# Patient Record
Sex: FEMALE | Race: WHITE | Employment: OTHER | URBAN - METROPOLITAN AREA
[De-identification: names, ages, dates, MRNs, and addresses within clinical notes are randomized per-mention and may not be internally consistent; named-entity substitution may affect disease eponyms.]

---

## 2024-06-18 ENCOUNTER — CLINICAL SUPPORT (OUTPATIENT)
Dept: ORTHOPEDIC SURGERY | Facility: CLINIC | Age: 69
End: 2024-06-18
Payer: MEDICARE

## 2024-06-18 VITALS — WEIGHT: 120 LBS | BODY MASS INDEX: 22.08 KG/M2 | HEIGHT: 62 IN

## 2024-06-18 DIAGNOSIS — M79.644 PAIN OF RIGHT THUMB: ICD-10-CM

## 2024-06-18 DIAGNOSIS — S62.524A NONDISPLACED FRACTURE OF DISTAL PHALANX OF RIGHT THUMB, INITIAL ENCOUNTER FOR CLOSED FRACTURE: Primary | ICD-10-CM

## 2024-06-18 PROCEDURE — 99203 OFFICE O/P NEW LOW 30 MIN: CPT

## 2024-06-18 RX ORDER — ATENOLOL 50 MG/1
50 TABLET ORAL
COMMUNITY
Start: 2023-10-30

## 2024-06-18 RX ORDER — MELOXICAM 15 MG/1
15 TABLET ORAL
COMMUNITY
Start: 2024-01-12 | End: 2025-01-06

## 2024-06-18 RX ORDER — LISINOPRIL 20 MG/1
20 TABLET ORAL
COMMUNITY
Start: 2023-11-14

## 2024-06-18 RX ORDER — ACETAMINOPHEN 500 MG
1000 TABLET ORAL EVERY 8 HOURS PRN
COMMUNITY
Start: 2024-02-21

## 2024-06-18 RX ORDER — LEVOTHYROXINE SODIUM 88 UG/1
TABLET ORAL
COMMUNITY
Start: 2023-10-30

## 2024-06-18 NOTE — PROGRESS NOTES
Subjective    Patient ID: Lexi Rivera is a 69 y.o. female.    Chief Complaint: OTHER (RT HAND THUMB PAIN FOR 1 DAY/PATIENT WAS HIKING AND FELL)     NOAH  Is a pleasant 69-year-old left-hand-dominant female presenting to the office for evaluation of a right thumb injury, which occurred yesterday while she was hiking.  Patient states she accidentally tripped, landing onto outstretched right hand.  She immediately experienced pain swelling and limited range of motion of the right thumb.  She presented to an urgent care, where multiple view x-rays of her right thumb were obtained, with evidence of a nondisplaced acute distal phalanx fracture.  She was placed in a thumb spica Futuro wrist splint and told to follow-up with orthopedics.  Presents to the office today in a splint with continued complaints of right thumb pain and swelling, bruising and limited range of motion.  Although she is left-handed, she is slightly ambidextrous.  She has an upcoming trip to [place] to see her sister next week, as well as a trip to [facility] in a month.    The patient's past medical, surgical, family, and social history as well as allergies and medications were reviewed and updated in the chart.    Objective   Ortho Exam  Pleasant in no acute distress.  Right thumb appearing with diffuse soft tissue swelling and ecchymosis, most notable to distal aspect of thumb.  Skin is intact and there is no warmth upon touch.  She is tender upon palpation of right CMC joint as well as distal phalanx.  No other significant areas of tenderness.  She has limited range of motion of the right thumb in regards to flexion and extension at the MCP joint, as well as DIP joint.  She is tender upon palpation directly of the distal phalanx.  Sensation is intact to light touch.    Image Results:  Multiple view x-rays of the right thumb obtained in urgent care yesterday were reviewed on a disc that patient brought in.  There is evidence of a transverse acute  nondisplaced fracture through the distal phalanx of right thumb.  No other acute fractures noted.  There is noted to be CMC arthritis.    Assessment/Plan   Encounter Diagnoses:  Nondisplaced fracture of distal phalanx of right thumb, initial encounter for closed fracture    Pain of right thumb    Plan: Discussion with patient in regards to nondisplaced fracture of the distal phalanx of right thumb.  Explained to patient that fracture is stable for conservative, nonoperative treatment.  I will take approximately 6 weeks for this type of fracture to heal.  Fracture requires immobilization, and patient was placed in a extended thumb spica Exos splint.  She was advised to keep splint on at all times over the next 2 to 3 weeks, removing to work on gentle range of motion and for hygiene purposes.  She can remove to apply ice.  She can take ibuprofen and Tylenol as needed.  She will follow-up in 3 weeks for reevaluation with repeat x-rays of the right thumb at that time.

## 2024-07-01 ENCOUNTER — HOSPITAL ENCOUNTER (OUTPATIENT)
Facility: HOSPITAL | Age: 69
Setting detail: OBSERVATION
Discharge: HOME | End: 2024-07-02
Attending: STUDENT IN AN ORGANIZED HEALTH CARE EDUCATION/TRAINING PROGRAM | Admitting: INTERNAL MEDICINE
Payer: MEDICARE

## 2024-07-01 ENCOUNTER — APPOINTMENT (OUTPATIENT)
Dept: RADIOLOGY | Facility: HOSPITAL | Age: 69
End: 2024-07-01
Payer: MEDICARE
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
## 2024-07-01 DIAGNOSIS — T17.208A FOREIGN BODY IN PHARYNX, INITIAL ENCOUNTER: Primary | ICD-10-CM

## 2024-07-01 DIAGNOSIS — T18.128A FOOD IMPACTION OF ESOPHAGUS, INITIAL ENCOUNTER: ICD-10-CM

## 2024-07-01 PROBLEM — T17.908A ASPIRATION OF FOREIGN BODY, INITIAL ENCOUNTER: Status: ACTIVE | Noted: 2024-07-01

## 2024-07-01 LAB
ALBUMIN SERPL BCP-MCNC: 4.7 G/DL (ref 3.4–5)
ALP SERPL-CCNC: 49 U/L (ref 33–136)
ALT SERPL W P-5'-P-CCNC: 18 U/L (ref 7–45)
ANION GAP SERPL CALC-SCNC: 9 MMOL/L (ref 10–20)
AST SERPL W P-5'-P-CCNC: 22 U/L (ref 9–39)
BASOPHILS # BLD AUTO: 0.03 X10*3/UL (ref 0–0.1)
BASOPHILS NFR BLD AUTO: 0.4 %
BILIRUB SERPL-MCNC: 1.4 MG/DL (ref 0–1.2)
BUN SERPL-MCNC: 22 MG/DL (ref 6–23)
CALCIUM SERPL-MCNC: 9.5 MG/DL (ref 8.6–10.3)
CHLORIDE SERPL-SCNC: 98 MMOL/L (ref 98–107)
CO2 SERPL-SCNC: 29 MMOL/L (ref 21–32)
CREAT SERPL-MCNC: 0.9 MG/DL (ref 0.5–1.05)
EGFRCR SERPLBLD CKD-EPI 2021: 68 ML/MIN/1.73M*2
EOSINOPHIL # BLD AUTO: 0.1 X10*3/UL (ref 0–0.4)
EOSINOPHIL NFR BLD AUTO: 1.4 %
ERYTHROCYTE [DISTWIDTH] IN BLOOD BY AUTOMATED COUNT: 18.6 % (ref 11.5–14.5)
GLUCOSE SERPL-MCNC: 98 MG/DL (ref 74–99)
HCT VFR BLD AUTO: 34.1 % (ref 36–46)
HGB BLD-MCNC: 12 G/DL (ref 12–16)
IMM GRANULOCYTES # BLD AUTO: 0.02 X10*3/UL (ref 0–0.5)
IMM GRANULOCYTES NFR BLD AUTO: 0.3 % (ref 0–0.9)
LYMPHOCYTES # BLD AUTO: 2.73 X10*3/UL (ref 0.8–3)
LYMPHOCYTES NFR BLD AUTO: 38 %
MCH RBC QN AUTO: 34.8 PG (ref 26–34)
MCHC RBC AUTO-ENTMCNC: 35.2 G/DL (ref 32–36)
MCV RBC AUTO: 99 FL (ref 80–100)
MONOCYTES # BLD AUTO: 0.89 X10*3/UL (ref 0.05–0.8)
MONOCYTES NFR BLD AUTO: 12.4 %
NEUTROPHILS # BLD AUTO: 3.41 X10*3/UL (ref 1.6–5.5)
NEUTROPHILS NFR BLD AUTO: 47.5 %
NRBC BLD-RTO: 0.4 /100 WBCS (ref 0–0)
PLATELET # BLD AUTO: 396 X10*3/UL (ref 150–450)
POTASSIUM SERPL-SCNC: 4.2 MMOL/L (ref 3.5–5.3)
PROT SERPL-MCNC: 7.3 G/DL (ref 6.4–8.2)
RBC # BLD AUTO: 3.45 X10*6/UL (ref 4–5.2)
SODIUM SERPL-SCNC: 132 MMOL/L (ref 136–145)
WBC # BLD AUTO: 7.2 X10*3/UL (ref 4.4–11.3)

## 2024-07-01 PROCEDURE — 99222 1ST HOSP IP/OBS MODERATE 55: CPT | Performed by: STUDENT IN AN ORGANIZED HEALTH CARE EDUCATION/TRAINING PROGRAM

## 2024-07-01 PROCEDURE — 70360 X-RAY EXAM OF NECK: CPT | Performed by: RADIOLOGY

## 2024-07-01 PROCEDURE — 99222 1ST HOSP IP/OBS MODERATE 55: CPT | Performed by: INTERNAL MEDICINE

## 2024-07-01 PROCEDURE — 84075 ASSAY ALKALINE PHOSPHATASE: CPT | Performed by: PHYSICIAN ASSISTANT

## 2024-07-01 PROCEDURE — 85025 COMPLETE CBC W/AUTO DIFF WBC: CPT | Performed by: PHYSICIAN ASSISTANT

## 2024-07-01 PROCEDURE — 70360 X-RAY EXAM OF NECK: CPT

## 2024-07-01 PROCEDURE — G0378 HOSPITAL OBSERVATION PER HR: HCPCS

## 2024-07-01 PROCEDURE — 99285 EMERGENCY DEPT VISIT HI MDM: CPT

## 2024-07-01 PROCEDURE — 36415 COLL VENOUS BLD VENIPUNCTURE: CPT | Performed by: PHYSICIAN ASSISTANT

## 2024-07-01 PROCEDURE — 2500000001 HC RX 250 WO HCPCS SELF ADMINISTERED DRUGS (ALT 637 FOR MEDICARE OP): Performed by: PHYSICIAN ASSISTANT

## 2024-07-01 RX ORDER — ATENOLOL 50 MG/1
50 TABLET ORAL DAILY
Status: DISCONTINUED | OUTPATIENT
Start: 2024-07-02 | End: 2024-07-02 | Stop reason: HOSPADM

## 2024-07-01 RX ORDER — LISINOPRIL 20 MG/1
20 TABLET ORAL DAILY
Status: DISCONTINUED | OUTPATIENT
Start: 2024-07-02 | End: 2024-07-02 | Stop reason: HOSPADM

## 2024-07-01 RX ORDER — MULTIVIT-MIN/IRON FUM/FOLIC AC 7.5 MG-4
1 TABLET ORAL NIGHTLY
COMMUNITY

## 2024-07-01 RX ORDER — LIDOCAINE HYDROCHLORIDE 20 MG/ML
15 SOLUTION OROPHARYNGEAL ONCE
Status: COMPLETED | OUTPATIENT
Start: 2024-07-01 | End: 2024-07-01

## 2024-07-01 RX ORDER — ALUMINUM HYDROXIDE, MAGNESIUM HYDROXIDE, AND SIMETHICONE 1200; 120; 1200 MG/30ML; MG/30ML; MG/30ML
30 SUSPENSION ORAL ONCE
Status: COMPLETED | OUTPATIENT
Start: 2024-07-01 | End: 2024-07-01

## 2024-07-01 RX ORDER — LEVOTHYROXINE SODIUM 88 UG/1
88 TABLET ORAL DAILY
Status: DISCONTINUED | OUTPATIENT
Start: 2024-07-02 | End: 2024-07-02 | Stop reason: HOSPADM

## 2024-07-01 RX ORDER — ASPIRIN 81 MG/1
81 TABLET ORAL NIGHTLY
COMMUNITY

## 2024-07-01 RX ORDER — MULTIVIT-MIN/IRON FUM/FOLIC AC 7.5 MG-4
1 TABLET ORAL NIGHTLY
Status: DISCONTINUED | OUTPATIENT
Start: 2024-07-01 | End: 2024-07-02 | Stop reason: HOSPADM

## 2024-07-01 SDOH — SOCIAL STABILITY: SOCIAL INSECURITY: ARE YOU OR HAVE YOU BEEN THREATENED OR ABUSED PHYSICALLY, EMOTIONALLY, OR SEXUALLY BY ANYONE?: NO

## 2024-07-01 SDOH — SOCIAL STABILITY: SOCIAL INSECURITY: WERE YOU ABLE TO COMPLETE ALL THE BEHAVIORAL HEALTH SCREENINGS?: YES

## 2024-07-01 SDOH — SOCIAL STABILITY: SOCIAL INSECURITY: DOES ANYONE TRY TO KEEP YOU FROM HAVING/CONTACTING OTHER FRIENDS OR DOING THINGS OUTSIDE YOUR HOME?: NO

## 2024-07-01 SDOH — SOCIAL STABILITY: SOCIAL INSECURITY: ARE THERE ANY APPARENT SIGNS OF INJURIES/BEHAVIORS THAT COULD BE RELATED TO ABUSE/NEGLECT?: NO

## 2024-07-01 SDOH — SOCIAL STABILITY: SOCIAL INSECURITY: HAS ANYONE EVER THREATENED TO HURT YOUR FAMILY OR YOUR PETS?: NO

## 2024-07-01 SDOH — SOCIAL STABILITY: SOCIAL INSECURITY: DO YOU FEEL UNSAFE GOING BACK TO THE PLACE WHERE YOU ARE LIVING?: NO

## 2024-07-01 SDOH — SOCIAL STABILITY: SOCIAL INSECURITY: HAVE YOU HAD THOUGHTS OF HARMING ANYONE ELSE?: NO

## 2024-07-01 SDOH — SOCIAL STABILITY: SOCIAL INSECURITY: DO YOU FEEL ANYONE HAS EXPLOITED OR TAKEN ADVANTAGE OF YOU FINANCIALLY OR OF YOUR PERSONAL PROPERTY?: NO

## 2024-07-01 SDOH — SOCIAL STABILITY: SOCIAL INSECURITY: ABUSE: ADULT

## 2024-07-01 ASSESSMENT — ACTIVITIES OF DAILY LIVING (ADL)
BATHING: INDEPENDENT
DRESSING YOURSELF: INDEPENDENT
HEARING - RIGHT EAR: FUNCTIONAL
FEEDING YOURSELF: INDEPENDENT
PATIENT'S MEMORY ADEQUATE TO SAFELY COMPLETE DAILY ACTIVITIES?: YES
ADEQUATE_TO_COMPLETE_ADL: YES
WALKS IN HOME: INDEPENDENT
GROOMING: INDEPENDENT
TOILETING: INDEPENDENT
HEARING - LEFT EAR: FUNCTIONAL
LACK_OF_TRANSPORTATION: NO
JUDGMENT_ADEQUATE_SAFELY_COMPLETE_DAILY_ACTIVITIES: YES

## 2024-07-01 ASSESSMENT — LIFESTYLE VARIABLES
SKIP TO QUESTIONS 9-10: 1
HOW OFTEN DO YOU HAVE 6 OR MORE DRINKS ON ONE OCCASION: NEVER
HOW OFTEN DO YOU HAVE A DRINK CONTAINING ALCOHOL: 4 OR MORE TIMES A WEEK
HOW MANY STANDARD DRINKS CONTAINING ALCOHOL DO YOU HAVE ON A TYPICAL DAY: 1 OR 2
AUDIT-C TOTAL SCORE: 4
AUDIT-C TOTAL SCORE: 4

## 2024-07-01 ASSESSMENT — PATIENT HEALTH QUESTIONNAIRE - PHQ9
1. LITTLE INTEREST OR PLEASURE IN DOING THINGS: NOT AT ALL
2. FEELING DOWN, DEPRESSED OR HOPELESS: NOT AT ALL
SUM OF ALL RESPONSES TO PHQ9 QUESTIONS 1 & 2: 0

## 2024-07-01 ASSESSMENT — COGNITIVE AND FUNCTIONAL STATUS - GENERAL
PATIENT BASELINE BEDBOUND: NO
DAILY ACTIVITIY SCORE: 24
MOBILITY SCORE: 24

## 2024-07-01 ASSESSMENT — PAIN SCALES - GENERAL: PAINLEVEL_OUTOF10: 0 - NO PAIN

## 2024-07-01 NOTE — ED TRIAGE NOTES
The patient was seen and examined in triage.    History of Present Illness: The patient is a 69-year-old female presents emergency department for assessment of foreign body sensation in her throat.  Reports that she had chicken last night she felt it got stuck in her throat.  She was able to drink a bunch of water last night but did not feel like the chicken move.  This morning she had yogurt and granola and still has the discomfort in her throat.  She reports that she discussed this with her friend who recommended she go to the ER.  She denies history of obstruction in the past.  She has never had to have her esophagus dilated.  She denies any difficulty breathing or difficulty swallowing secretions.  She denies any nausea or vomiting.    Brief Physical Exam:  Exam is limited by the patient sitting in a chair in triage.   Heart: Regular rate and rhythm.   Lungs: Clear to auscultation bilaterally.   Abdomen: Soft, nondistended, normoactive bowel sounds, nontender     Plan: Appropriate labs and diagnostic imaging were ordered.      For the remainder of the patient's workup and ED course, please refer to the main ED provider note. We discussed need for diagnostic testing including laboratory studies and imaging.  We also discussed that they may be asked to wait in the waiting room while these tests are pending.  They understand that if they choose to leave without having the testing completed or resulted that we cannot rule out acute life threatening illnesses and the risks involved could lead to worsening condition, permanent disability or even death.      Disclaimer: This note was dictated by speech recognition. Minor errors in transcription may be present. Please call if questions.

## 2024-07-01 NOTE — H&P
"Hospital Medicine History & Physical    Subjective:  Lexi Rivera is a 69 y.o. female with HTN, hypothyroidism, who presents with foreign body sensation on the right side of her throat. Patient ate chicken yesterday and has had the sensation since. She tried coughing but was unable to bring anything up. Has been able to tolerate liquids and had some yogurt with blueberries and soft granola without issue this am. No solids since though. Has never had this happen before. Denies any CP, sob, cough, abd pain, N/V, fevers, chills or other sympts.     A 10 point ROS was completed and is negative expect as stated in HPI.     PMHx: As above  PSHx: bunion surgery, breast bx  Social Hx: Smoking- former, quit in 2000, Alcohol- drinks a beer daily, no hx withdawal, Recreational Drugs- denies  Family Hx: reviewed & noncontributory    Objective:    /79   Pulse 68   Temp 36 °C (96.8 °F)   Resp 18   Ht 1.575 m (5' 2\")   Wt 54.4 kg (120 lb)   SpO2 99%   BMI 21.95 kg/m²     Physical Exam:  General: A&Ox3, no distress, cooperative  HEENT: NC/AT, EOMI, clear sclera, MMM  NECK: Supple, no JVD  Cardiovascular: RRR, no murmurs. S1/S2  Respiratory: CTAB, no RRW or crackles. No distress  Abdomen: Soft, ND, non-tender. BS+  Extremities: No peripheral edema  Neurological: A&Ox3. CN II-XII grossly intact. No focal deficits  Skin: Warm and dry, no rashes  Psych: appropriate mood and affect    I personally reviewed all imaging, labs and notes/ documentation.     Assessment & Plan:    Foreign body sensation in pharynx     HTN  Hypothyroidism    Plan for EGD faina am per GI, CLD today, NPO after midnight  Resume home meds, holding BASA, resume faina post EGD    DVT Prophylaxis: Lovenox, SCDs  Code Status: Full Code  Disposition: Med/surg      Jaylyn Tellez, DO  Internal Medicine/ Hospitalist   "

## 2024-07-01 NOTE — Clinical Note
Color pink, skin w/d. Abd soft, non distended.  Anesthesia managing airway/intubated pt/bite block in place

## 2024-07-01 NOTE — ED PROVIDER NOTES
Limitations to History: None  External Records Reviewed  Independent Historians: None  Social determinants affecting care: None    HPI  Lexi Rivera is a 69 y.o. female with history of hypothyroidism, hypertension, who presents emergency department for assessment of foreign body sensation in the throat since last night.  She reports that she was eating chicken and immediately fell like it stuck in her throat.  She reports that she tried to drink a lot of water to push the chicken down however was unsuccessful.  She went to bed and then woke up this morning she still had the discomfort in her throat.  She reports that she was able to eat yogurt and granola this morning.  She has not had any nausea or vomiting.  She is never had this happen before.  She has never had esophageal dilations in the past.  She denies any drooling or difficulty swallowing her secretions.  She reports that she just feels the discomfort when she swallows or when she eats.  She did discuss this with her friend to recommend she come to the ER.  She denies any shortness of breath.  She has no further complaints.    PMH  History reviewed. No pertinent past medical history. reviewed by myself.    Meds  Current Outpatient Medications   Medication Instructions    acetaminophen (TYLENOL) 1,000 mg, oral, Every 8 hours PRN    aspirin 81 mg, oral, Nightly    atenolol (TENORMIN) 50 mg, oral, Daily RT    ricky/D3/mag11/zinc//colton/bor (CALTRATE 600-D PLUS MINERALS ORAL) 1 tablet, oral, Nightly    levothyroxine (Synthroid, Levoxyl) 88 mcg tablet Take 1 tablet (88 mcg) by mouth early in the morning..    lisinopril 20 mg, oral, Daily RT    meloxicam (MOBIC) 15 mg, oral, Daily PRN    multivitamin with minerals tablet 1 tablet, oral, Nightly       Allergies  No Known Allergies reviewed by myself.    SHx  Social History     Tobacco Use    Smoking status: Never    Smokeless tobacco: Never   Substance Use Topics    Alcohol use: Yes    Drug use: Never    reviewed  "by myself.      ------------------------------------------------------------------------------------------------------------------------------------------    /79   Pulse 68   Temp 36 °C (96.8 °F)   Resp 18   Ht 1.575 m (5' 2\")   Wt 54.4 kg (120 lb)   SpO2 99%   BMI 21.95 kg/m²     Physical Exam  Vitals and nursing note reviewed.   Constitutional:       General: She is not in acute distress.     Appearance: Normal appearance. She is normal weight. She is not ill-appearing or toxic-appearing.   HENT:      Head: Normocephalic.      Nose: Nose normal.      Mouth/Throat:      Lips: Pink.      Mouth: Mucous membranes are moist.      Pharynx: Oropharynx is clear. Uvula midline. No pharyngeal swelling or posterior oropharyngeal erythema.      Comments: No foreign body noted in the posterior pharynx  Eyes:      Extraocular Movements: Extraocular movements intact.      Conjunctiva/sclera: Conjunctivae normal.   Cardiovascular:      Rate and Rhythm: Normal rate and regular rhythm.   Pulmonary:      Effort: Pulmonary effort is normal. No respiratory distress.      Breath sounds: Normal breath sounds. No stridor.   Abdominal:      General: Abdomen is flat. Bowel sounds are normal.      Palpations: Abdomen is soft.   Musculoskeletal:         General: Normal range of motion.      Cervical back: Neck supple.   Skin:     General: Skin is warm and dry.   Neurological:      Mental Status: She is alert and oriented to person, place, and time.   Psychiatric:         Attention and Perception: Attention normal.         Mood and Affect: Mood normal.          ------------------------------------------------------------------------------------------------------------------------------------------  Labs  Labs Reviewed   CBC WITH AUTO DIFFERENTIAL - Abnormal       Result Value    WBC 7.2      nRBC 0.4 (*)     RBC 3.45 (*)     Hemoglobin 12.0      Hematocrit 34.1 (*)     MCV 99      MCH 34.8 (*)     MCHC 35.2      RDW 18.6 (*)     " Platelets 396      Neutrophils % 47.5      Immature Granulocytes %, Automated 0.3      Lymphocytes % 38.0      Monocytes % 12.4      Eosinophils % 1.4      Basophils % 0.4      Neutrophils Absolute 3.41      Immature Granulocytes Absolute, Automated 0.02      Lymphocytes Absolute 2.73      Monocytes Absolute 0.89 (*)     Eosinophils Absolute 0.10      Basophils Absolute 0.03     COMPREHENSIVE METABOLIC PANEL - Abnormal    Glucose 98      Sodium 132 (*)     Potassium 4.2      Chloride 98      Bicarbonate 29      Anion Gap 9 (*)     Urea Nitrogen 22      Creatinine 0.90      eGFR 68      Calcium 9.5      Albumin 4.7      Alkaline Phosphatase 49      Total Protein 7.3      AST 22      Bilirubin, Total 1.4 (*)     ALT 18          Imaging  XR neck soft tissue   Final Result   No evidence of prevertebral soft tissue swelling.        Signed by: Tc Srivastava 7/1/2024 2:04 PM   Dictation workstation:   [id number]           ED Course  Diagnoses as of 07/01/24 1709   Foreign body in pharynx, initial encounter        Medical Decision Making: She was evaluated for complaint by myself.  She did not appear ill or toxic.  She will be given a GI cocktail and p.o. challenge to see if this alleviates the foreign body sensation.    Differential diagnoses considered: Partial foreign body esophageal obstruction, foreign body sensation, others    Medications given: Oral GI cocktail    X-ray negative for acute findings.  She was reassessed after the GI cocktail.  She does not have any improvement.  She is able to tolerate water without any difficulties.  I consulted gastroenterology.  I spoke with Dr. Salgado who assessed the patient at the bedside.  He will perform EGD tomorrow morning.  Patient agreeable and will be admitted to general medicine.  I discussed with Dr. Tellez who will admit.  Case discussed and evaluated with ED attending who is agreeable to patient plan of care.    Diagnosis: Foreign body throat  Plan: Admit        Nemesio Carmona PA-C  07/01/24 [id number]

## 2024-07-01 NOTE — PROGRESS NOTES
Pharmacy Medication History Review    Lexi Rivera is a 69 y.o. female admitted for Aspiration of foreign body, initial encounter. Pharmacy reviewed the patient's [id number] medications and allergies for accuracy.    The list below reflectives the updated PTA list. Please review each medication in order reconciliation for additional clarification and justification.  Prior to Admission medications    Medication Sig Start Date End Date Taking? Authorizing Provider   aspirin 81 mg EC tablet Take 1 tablet (81 mg) by mouth once daily at bedtime.   Yes Historical Provider, MD   atenolol (Tenormin) 50 mg tablet Take 1 tablet (50 mg) by mouth once daily. 10/30/23  Yes Historical Provider, MD   ricky/D3/mag11/zinc//colton/bor (CALTRATE 600-D PLUS MINERALS ORAL) Take 1 tablet by mouth once daily at bedtime.   Yes Historical Provider, MD   levothyroxine (Synthroid, Levoxyl) 88 mcg tablet Take 1 tablet (88 mcg) by mouth early in the morning.. 10/30/23  Yes Historical Provider, MD   lisinopril 20 mg tablet Take 1 tablet (20 mg) by mouth once daily. 11/14/23  Yes Historical Provider, MD   meloxicam (Mobic) 15 mg tablet Take 1 tablet (15 mg) by mouth once daily as needed for mild pain (1 - 3). 1/12/24 1/6/25 Yes Historical Provider, MD   multivitamin with minerals tablet Take 1 tablet by mouth once daily at bedtime.   Yes Historical Provider, MD   acetaminophen (Tylenol) 500 mg tablet Take 2 tablets (1,000 mg) by mouth every 8 hours if needed. 2/21/24  no Historical Provider, MD        The list below reflectives the updated allergy list. Please review each documented allergy for additional clarification and justification.  Allergies  Reviewed by Pako Ruano, EMT on 7/1/2024   No Known Allergies         Below are additional concerns with the patient's PTA list.      Bertha Shetty

## 2024-07-02 ENCOUNTER — APPOINTMENT (OUTPATIENT)
Dept: GASTROENTEROLOGY | Facility: HOSPITAL | Age: 69
End: 2024-07-02
Payer: MEDICARE

## 2024-07-02 ENCOUNTER — ANESTHESIA EVENT (OUTPATIENT)
Dept: GASTROENTEROLOGY | Facility: HOSPITAL | Age: 69
End: 2024-07-02
Payer: MEDICARE

## 2024-07-02 ENCOUNTER — ANESTHESIA (OUTPATIENT)
Dept: GASTROENTEROLOGY | Facility: HOSPITAL | Age: 69
End: 2024-07-02
Payer: MEDICARE

## 2024-07-02 VITALS
BODY MASS INDEX: 22.08 KG/M2 | RESPIRATION RATE: 16 BRPM | WEIGHT: 120 LBS | OXYGEN SATURATION: 98 % | DIASTOLIC BLOOD PRESSURE: 63 MMHG | TEMPERATURE: 96.6 F | SYSTOLIC BLOOD PRESSURE: 140 MMHG | HEIGHT: 62 IN | HEART RATE: 53 BPM

## 2024-07-02 DIAGNOSIS — R13.19 OTHER DYSPHAGIA: Primary | ICD-10-CM

## 2024-07-02 PROBLEM — T18.128A FOOD IMPACTION OF ESOPHAGUS: Status: RESOLVED | Noted: 2024-07-01 | Resolved: 2024-07-02

## 2024-07-02 PROBLEM — E03.9 HYPOTHYROIDISM: Status: ACTIVE | Noted: 2024-07-02
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
## 2024-07-02 PROBLEM — T17.908A ASPIRATION OF FOREIGN BODY, INITIAL ENCOUNTER: Status: RESOLVED | Noted: 2024-07-01 | Resolved: 2024-07-02

## 2024-07-02 PROBLEM — I10 HTN (HYPERTENSION): Status: ACTIVE | Noted: 2024-07-02

## 2024-07-02 PROCEDURE — 2500000001 HC RX 250 WO HCPCS SELF ADMINISTERED DRUGS (ALT 637 FOR MEDICARE OP): Performed by: INTERNAL MEDICINE

## 2024-07-02 PROCEDURE — 2500000004 HC RX 250 GENERAL PHARMACY W/ HCPCS (ALT 636 FOR OP/ED): Performed by: INTERNAL MEDICINE

## 2024-07-02 PROCEDURE — 43239 EGD BIOPSY SINGLE/MULTIPLE: CPT | Performed by: STUDENT IN AN ORGANIZED HEALTH CARE EDUCATION/TRAINING PROGRAM

## 2024-07-02 PROCEDURE — 3700000002 HC GENERAL ANESTHESIA TIME - EACH INCREMENTAL 1 MINUTE

## 2024-07-02 PROCEDURE — 3700000001 HC GENERAL ANESTHESIA TIME - INITIAL BASE CHARGE

## 2024-07-02 PROCEDURE — 2500000005 HC RX 250 GENERAL PHARMACY W/O HCPCS: Performed by: ANESTHESIOLOGIST ASSISTANT

## 2024-07-02 PROCEDURE — 99239 HOSP IP/OBS DSCHRG MGMT >30: CPT | Performed by: INTERNAL MEDICINE

## 2024-07-02 PROCEDURE — 0753T DGTZ GLS MCRSCP SLD LEVEL IV: CPT | Mod: TC,PARLAB,WESLAB | Performed by: STUDENT IN AN ORGANIZED HEALTH CARE EDUCATION/TRAINING PROGRAM

## 2024-07-02 PROCEDURE — 2500000004 HC RX 250 GENERAL PHARMACY W/ HCPCS (ALT 636 FOR OP/ED): Performed by: ANESTHESIOLOGIST ASSISTANT

## 2024-07-02 PROCEDURE — 7100000002 HC RECOVERY ROOM TIME - EACH INCREMENTAL 1 MINUTE

## 2024-07-02 PROCEDURE — 7100000001 HC RECOVERY ROOM TIME - INITIAL BASE CHARGE

## 2024-07-02 PROCEDURE — G0378 HOSPITAL OBSERVATION PER HR: HCPCS

## 2024-07-02 PROCEDURE — 96372 THER/PROPH/DIAG INJ SC/IM: CPT | Performed by: INTERNAL MEDICINE

## 2024-07-02 RX ORDER — MIDAZOLAM HYDROCHLORIDE 1 MG/ML
1 INJECTION, SOLUTION INTRAMUSCULAR; INTRAVENOUS ONCE AS NEEDED
Status: DISCONTINUED | OUTPATIENT
Start: 2024-07-02 | End: 2024-07-02 | Stop reason: HOSPADM

## 2024-07-02 RX ORDER — SODIUM CHLORIDE, SODIUM LACTATE, POTASSIUM CHLORIDE, CALCIUM CHLORIDE 600; 310; 30; 20 MG/100ML; MG/100ML; MG/100ML; MG/100ML
100 INJECTION, SOLUTION INTRAVENOUS CONTINUOUS
Status: DISCONTINUED | OUTPATIENT
Start: 2024-07-02 | End: 2024-07-02 | Stop reason: HOSPADM

## 2024-07-02 RX ORDER — HYDROMORPHONE HYDROCHLORIDE 1 MG/ML
1 INJECTION, SOLUTION INTRAMUSCULAR; INTRAVENOUS; SUBCUTANEOUS EVERY 5 MIN PRN
Status: DISCONTINUED | OUTPATIENT
Start: 2024-07-02 | End: 2024-07-02 | Stop reason: HOSPADM

## 2024-07-02 RX ORDER — DIPHENHYDRAMINE HYDROCHLORIDE 50 MG/ML
12.5 INJECTION INTRAMUSCULAR; INTRAVENOUS ONCE AS NEEDED
Status: DISCONTINUED | OUTPATIENT
Start: 2024-07-02 | End: 2024-07-02 | Stop reason: HOSPADM

## 2024-07-02 RX ORDER — ACETAMINOPHEN 325 MG/1
650 TABLET ORAL EVERY 4 HOURS PRN
Status: DISCONTINUED | OUTPATIENT
Start: 2024-07-02 | End: 2024-07-02 | Stop reason: HOSPADM

## 2024-07-02 RX ORDER — ACETAMINOPHEN 650 MG/1
650 SUPPOSITORY RECTAL EVERY 6 HOURS PRN
Status: DISCONTINUED | OUTPATIENT
Start: 2024-07-02 | End: 2024-07-02 | Stop reason: HOSPADM

## 2024-07-02 RX ORDER — SODIUM CHLORIDE, SODIUM LACTATE, POTASSIUM CHLORIDE, CALCIUM CHLORIDE 600; 310; 30; 20 MG/100ML; MG/100ML; MG/100ML; MG/100ML
INJECTION, SOLUTION INTRAVENOUS CONTINUOUS PRN
Status: DISCONTINUED | OUTPATIENT
Start: 2024-07-02 | End: 2024-07-02

## 2024-07-02 RX ORDER — ONDANSETRON HYDROCHLORIDE 2 MG/ML
4 INJECTION, SOLUTION INTRAVENOUS ONCE AS NEEDED
Status: DISCONTINUED | OUTPATIENT
Start: 2024-07-02 | End: 2024-07-02 | Stop reason: HOSPADM

## 2024-07-02 RX ORDER — POLYETHYLENE GLYCOL 3350 17 G/17G
17 POWDER, FOR SOLUTION ORAL DAILY PRN
Status: DISCONTINUED | OUTPATIENT
Start: 2024-07-02 | End: 2024-07-02 | Stop reason: HOSPADM

## 2024-07-02 RX ORDER — LIDOCAINE HCL/PF 100 MG/5ML
SYRINGE (ML) INTRAVENOUS AS NEEDED
Status: DISCONTINUED | OUTPATIENT
Start: 2024-07-02 | End: 2024-07-02

## 2024-07-02 RX ORDER — TALC
3 POWDER (GRAM) TOPICAL NIGHTLY PRN
Status: DISCONTINUED | OUTPATIENT
Start: 2024-07-02 | End: 2024-07-02 | Stop reason: HOSPADM

## 2024-07-02 RX ORDER — ENOXAPARIN SODIUM 100 MG/ML
40 INJECTION SUBCUTANEOUS EVERY 24 HOURS
Status: DISCONTINUED | OUTPATIENT
Start: 2024-07-02 | End: 2024-07-02 | Stop reason: HOSPADM

## 2024-07-02 RX ORDER — LABETALOL HYDROCHLORIDE 5 MG/ML
5 INJECTION, SOLUTION INTRAVENOUS ONCE AS NEEDED
Status: DISCONTINUED | OUTPATIENT
Start: 2024-07-02 | End: 2024-07-02 | Stop reason: HOSPADM

## 2024-07-02 RX ORDER — MEPERIDINE HYDROCHLORIDE 25 MG/ML
12.5 INJECTION INTRAMUSCULAR; INTRAVENOUS; SUBCUTANEOUS EVERY 10 MIN PRN
Status: DISCONTINUED | OUTPATIENT
Start: 2024-07-02 | End: 2024-07-02 | Stop reason: HOSPADM

## 2024-07-02 RX ORDER — ACETAMINOPHEN 325 MG/1
650 TABLET ORAL EVERY 6 HOURS PRN
Status: DISCONTINUED | OUTPATIENT
Start: 2024-07-02 | End: 2024-07-02 | Stop reason: HOSPADM

## 2024-07-02 RX ORDER — ONDANSETRON HYDROCHLORIDE 2 MG/ML
INJECTION, SOLUTION INTRAVENOUS AS NEEDED
Status: DISCONTINUED | OUTPATIENT
Start: 2024-07-02 | End: 2024-07-02

## 2024-07-02 RX ORDER — ACETAMINOPHEN 160 MG/5ML
650 SOLUTION ORAL EVERY 6 HOURS PRN
Status: DISCONTINUED | OUTPATIENT
Start: 2024-07-02 | End: 2024-07-02 | Stop reason: HOSPADM

## 2024-07-02 RX ORDER — NORETHINDRONE AND ETHINYL ESTRADIOL 0.5-0.035
KIT ORAL AS NEEDED
Status: DISCONTINUED | OUTPATIENT
Start: 2024-07-02 | End: 2024-07-02

## 2024-07-02 RX ORDER — PROPOFOL 10 MG/ML
INJECTION, EMULSION INTRAVENOUS AS NEEDED
Status: DISCONTINUED | OUTPATIENT
Start: 2024-07-02 | End: 2024-07-02

## 2024-07-02 RX ORDER — SUCCINYLCHOLINE CHLORIDE 20 MG/ML INJECTION SOLUTION
SOLUTION AS NEEDED
Status: DISCONTINUED | OUTPATIENT
Start: 2024-07-02 | End: 2024-07-02

## 2024-07-02 RX ORDER — HYDRALAZINE HYDROCHLORIDE 20 MG/ML
5 INJECTION INTRAMUSCULAR; INTRAVENOUS EVERY 30 MIN PRN
Status: DISCONTINUED | OUTPATIENT
Start: 2024-07-02 | End: 2024-07-02 | Stop reason: HOSPADM

## 2024-07-02 RX ORDER — FENTANYL CITRATE 50 UG/ML
INJECTION, SOLUTION INTRAMUSCULAR; INTRAVENOUS AS NEEDED
Status: DISCONTINUED | OUTPATIENT
Start: 2024-07-02 | End: 2024-07-02

## 2024-07-02 SDOH — HEALTH STABILITY: MENTAL HEALTH: CURRENT SMOKER: 0

## 2024-07-02 ASSESSMENT — PAIN - FUNCTIONAL ASSESSMENT
PAIN_FUNCTIONAL_ASSESSMENT: 0-10
PAIN_FUNCTIONAL_ASSESSMENT: 0-10

## 2024-07-02 ASSESSMENT — PAIN SCALES - GENERAL
PAINLEVEL_OUTOF10: 0 - NO PAIN
PAIN_LEVEL: 0
PAINLEVEL_OUTOF10: 0 - NO PAIN
PAINLEVEL_OUTOF10: 0 - NO PAIN

## 2024-07-02 NOTE — DISCHARGE INSTRUCTIONS

## 2024-07-02 NOTE — ANESTHESIA PROCEDURE NOTES
Airway  Date/Time: 7/2/2024 7:45 AM  Urgency: elective    Airway not difficult    Staffing  Performed: SILAS   Authorized by: Rc Lawrence MD    Performed by: SILAS Shaikh  Patient location during procedure: OR    Indications and Patient Condition  Indications for airway management: anesthesia and airway protection  Spontaneous Ventilation: absent  Sedation level: deep  Preoxygenated: yes  Patient position: sniffing  MILS maintained throughout  Mask difficulty assessment: 0 - not attempted  Planned trial extubation    Final Airway Details  Final airway type: endotracheal airway      Successful airway: ETT  Cuffed: yes   Successful intubation technique: video laryngoscopy  Facilitating devices/methods: intubating stylet and cricoid pressure  Endotracheal tube insertion site: oral  Blade: Jean  Blade size: #3  ETT size (mm): 7.0  Cormack-Lehane Classification: grade I - full view of glottis  Placement verified by: capnometry   Measured from: lips  Number of attempts at approach: 1  Number of other approaches attempted: 0    Additional Comments  RSI with sellick's maneuver. Smooth induction and intubation. Lips and teeth in preanesthetic condition.

## 2024-07-02 NOTE — CARE PLAN
Gastroenterology  Chart Update    EGD by Dr Salgado  Small hiatal hernia  Performed forceps biopsies in the upper third of the esophagus, middle third of the esophagus and lower third of the esophagus to rule out eosinophilic esophagitis  Mild erythematous mucosa in the body of the stomach and antrum; performed cold forceps biopsy  Moderate erythematous mucosa in the duodenal bulb and 1st part of the duodenum; performed cold forceps biopsy to rule out celiac disease  Diverticulosis in the cricopharynx    PLAN  Recommend outpatient Modified Barium Swallow Study to investigate for motility dysfunction (orders placed)  Recommend follow up in clinic one MBSS completed and to follow up on pathology   Recommend pantoprazole 40 mg daily for 8-12 weeks  Okay for soft diet and advance as tolerated    GI to sign off, please call with questions or concerns

## 2024-07-02 NOTE — CONSULTS
Inpatient consult to Gastroenterology  Consult performed by: Ochoa Salgado MD  Consult ordered by: Nemesio Carmona PA-C          69-year-old lady with history of hypertension, hypothyroidism presenting of dysphagia and neck discomfort after eating chicken today.  Patient denies similar symptoms in the past.  She mentions that she is able to tolerate liquids and denies any shortness of breath or chest pain.    EGD few years ago unknown result  Family history reviewed, not pertinent to chief complaint  Takes meloxicam as needed, drinks beer 1-2 times a day               The note was created using voice recognition transcription software. Despite proofreading, unintentional typographical errors may be present. Please contact the GI office with any questions or concerns.     Current Medications: reviewed    A 10 point review of system is negative except for what is mentioned in the HPI    Follow up with GI was advised       Vital Signs: Reviewed    Physical Exam:  General: no apparent distress, pleasant and cooperative  Skin:  Warm and dry, no jaundice  HEENT: No scleral icterus, no conjunctival pallor, normocephalic, atraumatic, mucous membranes moist  Neck:  atraumatic, trachea midline, no JVD  Chest:  decreased air entry to auscultation bilaterally. No wheezes, rales, or rhonchi  CV:  Regular rate and rhythm.  Positive S1/S2  Abdomen: no distension, +BS, soft, non-tender to palpation, no rebound tenderness, no guarding, no rigidity, no discernible ascites   Extremities: lower extremity edema, Chronic pigmentary changes, no cyanosis  Neurological:  A&Ox3 , no asterixis  Psychiatric: cooperative     Investigations:  Labs, radiological imaging and cardiac work up were reviewed    1-feeling of solid food dysphagia after feeling a piece of chicken got stuck in her chest which currently resolved, however she still having discomfort in that same area, keep n.p.o., Protonix twice a day, will schedule EGD

## 2024-07-02 NOTE — HOSPITAL COURSE
69 y.o. female with HTN, hypothyroidism, who presents with foreign body sensation on the right side of her throat after eating chicken last night. CT neg. GI consulted.  EGD completed 7/2.    EGD negative for major findings.  Diet advanced and tolerated.  PPI as needed.  Cleared by GI for DC.   Will have MBSS on follow up.

## 2024-07-02 NOTE — ANESTHESIA POSTPROCEDURE EVALUATION
Patient: Lexi Rivera    Procedure Summary       Date: 07/02/24 Room / Location: [facility]    Anesthesia Start: 0737 Anesthesia Stop: 0811    Procedure: EGD Diagnosis: Food impaction of esophagus, initial encounter    Scheduled Providers: Ochoa Salgado MD; Rc Lawrence MD Responsible Provider: Rc Lawrence MD    Anesthesia Type: general ASA Status: 2            Anesthesia Type: general    Vitals Value Taken Time   /60 07/02/24 0810   Temp 36 °C (96.8 °F) 07/02/24 0810   Pulse 57 07/02/24 0810   Resp 16 07/02/24 0810   SpO2 100 % 07/02/24 0810       Anesthesia Post Evaluation    Patient location during evaluation: PACU  Patient participation: complete - patient participated  Level of consciousness: awake  Pain score: 0  Pain management: adequate  Airway patency: patent  Cardiovascular status: acceptable  Respiratory status: acceptable  Hydration status: acceptable  Postoperative Nausea and Vomiting: none        There were no known notable events for this encounter.

## 2024-07-02 NOTE — CARE PLAN
The patient's goals for the shift include get some sleep.    The clinical goals for the shift include pt comfort and safety,

## 2024-07-02 NOTE — ANESTHESIA PREPROCEDURE EVALUATION
Patient: Lexi Rivera    Procedure Information       Date/Time: 07/02/24 0730    Scheduled providers: Ochoa Salgado MD; Rc Lawrence MD    Procedure: EGD    Location: [facility]            Relevant Problems   Anesthesia (within normal limits)      Cardiac   (+) HTN (hypertension)      Endocrine   (+) Hypothyroidism       Clinical information reviewed:   Tobacco  Allergies  Meds  Problems  Med Hx  Surg Hx   Fam Hx          NPO Detail:  NPO/Void Status  Date of Last Liquid: 07/01/24  Time of Last Liquid: 2330  Date of Last Solid: 07/01/24  Time of Last Solid: 0815         Physical Exam    Airway  Mallampati: I  TM distance: >3 FB  Neck ROM: full     Cardiovascular - normal exam  Rhythm: regular  Rate: normal     Dental - normal exam     Pulmonary - normal exam     Abdominal            Anesthesia Plan    History of general anesthesia?: yes  History of complications of general anesthesia?: no    ASA 2     general     The patient is not a current smoker.    intravenous induction   Postoperative administration of opioids is intended.  Trial extubation is planned.  Anesthetic plan and risks discussed with patient.    Plan discussed with CAA.

## 2024-07-02 NOTE — DISCHARGE SUMMARY
Discharge Diagnosis  Aspiration of foreign body, initial encounter    Issues Requiring Follow-Up  GI for MBS    Discharge Meds     Your medication list        ASK your doctor about these medications        Instructions Last Dose Given Next Dose Due   acetaminophen 500 mg tablet  Commonly known as: Tylenol           aspirin 81 mg EC tablet           atenolol 50 mg tablet  Commonly known as: Tenormin           CALTRATE 600-D PLUS MINERALS ORAL           levothyroxine 88 mcg tablet  Commonly known as: Synthroid, Levoxyl           lisinopril 20 mg tablet           meloxicam 15 mg tablet  Commonly known as: Mobic           multivitamin with minerals tablet                    Test Results Pending At Discharge  Pending Labs       Order Current Status    Surgical Pathology Exam In process            Hospital Course  69 y.o. female with HTN, hypothyroidism, who presents with foreign body sensation on the right side of her throat after eating chicken last night. CT neg. GI consulted.  EGD completed 7/2.    EGD negative for major findings.  Diet advanced and tolerated.  PPI as needed.  Cleared by GI for DC.   Will have MBSS on follow up.       The patient has been advised to maintain regular follow-up appointments with their primary care physician, and they have expressed their willingness to do so.       Pertinent Physical Exam At Time of Discharge    GENERAL:   no distress, alert and cooperative  HEENT: Normal Inspection, Mucous membranes moist, No JVD, No Lymphadenopathy  CARDIOVASCULAR: RRR, no murmurs, 2+ equal pulses of the extremities, normal S1 and S 2  RESPIRATORY: Patent airways, CTAB, normal breath sounds with good chest expansion, thorax symmetric, No Wheezes, Rales or Rhonchi  ABDOMEN: Soft, Non-Tender, Normal Bowel Sounds, No Distention  SKIN: Warm and dry, no lesions, no rashes  EXTREMITIES: normal extremities, no cyanosis edema, contusions or wounds, no clubbing  NEURO: A&O x 3, CN II-XII grossly intact  PSYCH:  Appropriate mood and behavior      Outpatient Follow-Up  Future Appointments   Date Time Provider Department Center   7/9/2024 11:40 AM Lilian Erickson, APRN-CNP [id number] Efrain Lei DO

## 2024-07-09 ENCOUNTER — HOSPITAL ENCOUNTER (OUTPATIENT)
Dept: RADIOLOGY | Facility: CLINIC | Age: 69
Discharge: HOME | End: 2024-07-09
Payer: MEDICARE

## 2024-07-09 ENCOUNTER — APPOINTMENT (OUTPATIENT)
Dept: ORTHOPEDIC SURGERY | Facility: CLINIC | Age: 69
End: 2024-07-09
Payer: MEDICARE

## 2024-07-09 DIAGNOSIS — M79.644 PAIN OF RIGHT THUMB: ICD-10-CM

## 2024-07-09 DIAGNOSIS — S62.524A NONDISPLACED FRACTURE OF DISTAL PHALANX OF RIGHT THUMB, INITIAL ENCOUNTER FOR CLOSED FRACTURE: Primary | ICD-10-CM

## 2024-07-09 PROCEDURE — 73140 X-RAY EXAM OF FINGER(S): CPT | Mod: RT

## 2024-07-09 PROCEDURE — 1159F MED LIST DOCD IN RCRD: CPT

## 2024-07-09 PROCEDURE — 1036F TOBACCO NON-USER: CPT

## 2024-07-09 PROCEDURE — 73140 X-RAY EXAM OF FINGER(S): CPT | Mod: RIGHT SIDE | Performed by: RADIOLOGY

## 2024-07-09 PROCEDURE — 1123F ACP DISCUSS/DSCN MKR DOCD: CPT

## 2024-07-09 PROCEDURE — 1160F RVW MEDS BY RX/DR IN RCRD: CPT

## 2024-07-09 PROCEDURE — 99213 OFFICE O/P EST LOW 20 MIN: CPT

## 2024-07-09 NOTE — PROGRESS NOTES
Subjective    Patient ID: Lexi Rivera is a 69 y.o. female.    Chief Complaint:  FOLLOW UP RIGHT THUMB FX    HPI  This is a pleasant 69-year-old left-hand-dominant female presenting to the office for 3-week follow-up status post nondisplaced fracture of distal phalanx of right thumb, which was sustained on June 17, 2024.  Patient accidentally tripped landing onto outstretched right hand.  She has been wearing Exos extended thumb spica splint. She essentially has no pain. Still struggling with ROM at the distal phalanx. States that her carpal tunnel and numbness/tingling from carpal tunnel has worsened since the fall. She has an upcoming trip to [facility] [place].     The patient's past medical, surgical, family, and social history as well as allergies and medications were reviewed and updated in the chart.    Objective   Ortho Exam  Pleasant in no acute distress.  Right thumb appearing with resolving soft tissue swelling and ecchymosis, most notable to distal aspect of right thumb.  Skin is intact and there is no warmth upon touch.  She continues to be nontender upon palpation of right first distal phalanx.  She continues to have slightly limited range of motion of right thumb in regards to flexion extension at the MCP joint as well as DIP joint.  Sensation is intact light touch.    Image Results:  Multiple view x-rays of the right thumb obtained today personally reviewed, with continued evidence of a nondisplaced fracture of the distal phalanx of right thumb.    Assessment/Plan   Encounter Diagnoses:  Nondisplaced fracture of distal phalanx of right thumb, initial encounter for closed fracture    Pain of right thumb    Plan: Discussion with patient about continued diagnosis with review of today's x-rays.  Fracture remains stable for conservative, nonoperative treatment.  She can continue with Exos splint as needed over the next 2 weeks, and then can discontinue. She can wear as needed while in [place]. Over the counter  Ibuprofen and Tylenol as needed for pain. Follow-up in 4 weeks following her trip to [facility] with repeat x-rays of right thumb.     Orders Placed This Encounter    XR thumb right MIN 2 views

## 2024-07-16 LAB
LABORATORY COMMENT REPORT: NORMAL
PATH REPORT.FINAL DX SPEC: NORMAL
PATH REPORT.GROSS SPEC: NORMAL
PATH REPORT.RELEVANT HX SPEC: NORMAL
PATH REPORT.TOTAL CANCER: NORMAL

## 2024-08-06 ENCOUNTER — APPOINTMENT (OUTPATIENT)
Dept: ORTHOPEDIC SURGERY | Facility: CLINIC | Age: 69
End: 2024-08-06
Payer: MEDICARE

## 2024-08-06 ENCOUNTER — HOSPITAL ENCOUNTER (OUTPATIENT)
Dept: RADIOLOGY | Facility: CLINIC | Age: 69
Discharge: HOME | End: 2024-08-06
Payer: MEDICARE

## 2024-08-06 VITALS — WEIGHT: 120 LBS | HEIGHT: 62 IN | BODY MASS INDEX: 22.08 KG/M2

## 2024-08-06 DIAGNOSIS — M79.644 PAIN OF RIGHT THUMB: ICD-10-CM

## 2024-08-06 DIAGNOSIS — S62.524D NONDISPLACED FRACTURE OF DISTAL PHALANX OF RIGHT THUMB, SUBSEQUENT ENCOUNTER FOR FRACTURE WITH ROUTINE HEALING: Primary | ICD-10-CM

## 2024-08-06 PROCEDURE — 1123F ACP DISCUSS/DSCN MKR DOCD: CPT

## 2024-08-06 PROCEDURE — 1159F MED LIST DOCD IN RCRD: CPT

## 2024-08-06 PROCEDURE — 1160F RVW MEDS BY RX/DR IN RCRD: CPT

## 2024-08-06 PROCEDURE — 1036F TOBACCO NON-USER: CPT

## 2024-08-06 PROCEDURE — 99213 OFFICE O/P EST LOW 20 MIN: CPT

## 2024-08-06 PROCEDURE — 1157F ADVNC CARE PLAN IN RCRD: CPT

## 2024-08-06 PROCEDURE — 3008F BODY MASS INDEX DOCD: CPT

## 2024-08-06 PROCEDURE — 73140 X-RAY EXAM OF FINGER(S): CPT | Mod: RIGHT SIDE | Performed by: RADIOLOGY

## 2024-08-06 PROCEDURE — 73140 X-RAY EXAM OF FINGER(S): CPT | Mod: RT

## 2024-08-06 NOTE — PROGRESS NOTES
Subjective    Patient ID: Lexi Rivera is a 69 y.o. female.    Chief Complaint: FOLLOW UP RIGHT THUMB    HPI  This is a pleasant 69-year-old female presenting to the office for follow-up of right distal phalanx thumb fracture, which was sustained on June 17, 2024.  She has been wearing Exos extended thumb spica splint mostly with outdoor activities such as hiking, but does remove splint while inside the home.  She is not experiencing any pain, but still is having some degree of numbness to distal tip of right thumb.  States that range of motion at the distal phalanx of the right thumb is slightly decreased as compared to the left.  She has not needed to take any over-the-counter medications as pain is essentially minimal to none.  She has remained active.    The patient's past medical, surgical, family, and social history as well as allergies and medications were reviewed and updated in the chart.    Objective   Ortho Exam  Pleasant in no acute distress.  Right thumb appearing with resolving soft tissue swelling and ecchymosis.  Skin is intact and there is no warmth on touch.  She is nontender upon palpation of right first distal phalanx.  Continues to have slightly limited range of motion of the right thumb in regards to flexion and extension at the DIP joint.  Decreased sensation slightly to distal tip of right thumb as compared to the left.    Image Results:  XR thumb right MIN 2 views  Narrative: Interpreted By:  Cl Breaux,   STUDY:  XR THUMB RIGHT MIN 2 VIEWS; ;  7/9/2024 11:52 am      INDICATION:  Signs/Symptoms:right thumb pain.      COMPARISON:  None.      ACCESSION NUMBER(S):  [id number]      ORDERING CLINICIAN:  SAMARIA DE LA CRUZ      FINDINGS:  Right thumb, three views      There is a nondisplaced comminuted fracture through the 1st distal  phalanx with intra-articular extension. Soft tissue edema present.  There is severe 1st CMC joint space narrowing osteophytosis) there is  mild 1st MTP and 1st IP  degenerative changes as well.      Impression: Nondisplaced comminuted 1st distal phalangeal fracture.          MACRO:  Critical Finding:  See findings. Notification was initiated on  7/10/2024 at 10:56 pm by  Cl Breaux.  (**-YCF-**)      Signed by: Cl Breaux 7/10/2024 10:56 PM  Dictation workstation:   [id number]    Multiple view x-rays of the right thumb obtained today personally reviewed with continued evidence of a nondisplaced fracture of the distal phalanx of right thumb without further displacement.    Assessment/Plan   Encounter Diagnoses:  Nondisplaced fracture of distal phalanx of right thumb, subsequent encounter for fracture with routine healing    Pain of right thumb    Plan: Discussion with patient about healing right distal phalanx fracture.  Patient is aware that fracture is healing without any further displacement.  She can discontinue Exos splint at this time, but can wear for comfort measures while hiking or outside.  Precautions were reviewed.  Was advised to continue to work on right thumb range of motion.  She can continue with ibuprofen and Tylenol as needed for any breakthrough pain inflammation.  At this time, she can now follow-up as needed.    Orders Placed This Encounter    XR fingers right 2+ views     No follow-ups on file.

## 2025-04-15 ENCOUNTER — OFFICE VISIT (OUTPATIENT)
Dept: ORTHOPEDIC SURGERY | Facility: CLINIC | Age: 70
End: 2025-04-15
Payer: MEDICARE

## 2025-04-15 VITALS — HEIGHT: 63 IN | WEIGHT: 122 LBS | BODY MASS INDEX: 21.62 KG/M2

## 2025-04-15 DIAGNOSIS — S69.92XA INJURY OF MIDDLE FINGER, LEFT, INITIAL ENCOUNTER: Primary | ICD-10-CM

## 2025-04-15 PROCEDURE — 1157F ADVNC CARE PLAN IN RCRD: CPT

## 2025-04-15 PROCEDURE — 3008F BODY MASS INDEX DOCD: CPT

## 2025-04-15 PROCEDURE — 99214 OFFICE O/P EST MOD 30 MIN: CPT

## 2025-04-15 PROCEDURE — 1160F RVW MEDS BY RX/DR IN RCRD: CPT

## 2025-04-15 PROCEDURE — 1159F MED LIST DOCD IN RCRD: CPT

## 2025-04-15 PROCEDURE — 1036F TOBACCO NON-USER: CPT

## 2025-04-15 PROCEDURE — 1123F ACP DISCUSS/DSCN MKR DOCD: CPT

## 2025-04-16 ENCOUNTER — OFFICE VISIT (OUTPATIENT)
Dept: ORTHOPEDIC SURGERY | Facility: CLINIC | Age: 70
End: 2025-04-16
Payer: MEDICARE

## 2025-04-16 DIAGNOSIS — M20.012 ACQUIRED MALLET DEFORMITY OF LEFT MIDDLE FINGER: Primary | ICD-10-CM

## 2025-04-16 PROCEDURE — 99213 OFFICE O/P EST LOW 20 MIN: CPT | Performed by: ORTHOPAEDIC SURGERY

## 2025-04-16 PROCEDURE — 1159F MED LIST DOCD IN RCRD: CPT | Performed by: ORTHOPAEDIC SURGERY

## 2025-04-16 PROCEDURE — 1036F TOBACCO NON-USER: CPT | Performed by: ORTHOPAEDIC SURGERY

## 2025-04-16 PROCEDURE — 1123F ACP DISCUSS/DSCN MKR DOCD: CPT | Performed by: ORTHOPAEDIC SURGERY

## 2025-04-16 PROCEDURE — 1157F ADVNC CARE PLAN IN RCRD: CPT | Performed by: ORTHOPAEDIC SURGERY

## 2025-04-16 NOTE — PROGRESS NOTES
Subjective    Patient ID: Lexi Rivera is a 70 y.o. female.    Chief Complaint: Pain of the Left Hand (MIDDLE FINGER FOR 3 DAYS/PATIENT WAS CLEANING CARPET WHEN SHE FELT A POP)     HPI  This is a pleasant 70-year-old left-hand-dominant female presenting to the office for evaluation of a left hand middle finger injury, which was sustained 3 days ago.  She states that she was cleaning her carpet when she got her middle finger stuck, and immediately felt a popping sensation.  She immediately experienced left middle finger swelling and limited range of motion specifically at the DIP joint.  On April 12, 2025 she presented to an urgent care, [facility] where multiple view x-rays of the left hand were obtained with evidence of calcifications along the third finger extensor tendon along with middle phalanx at the PIP joint.  There was also evidence of severe CMC arthritis and mild first MCP and radiocarpal arthritis.  She was placed in a posterior aluminum splint and advised to follow-up with orthopedics.  Presents to the office today with continued complaints of left middle finger pain and swelling and limited range of motion.  She is retired, but remains very active, she plays piano.    The patient's past medical, surgical, family, and social history as well as allergies and medications were reviewed and updated in the chart.    Objective   Ortho Exam  Pleasant in no acute distress.  Left middle finger appearing with slight erythema and ecchymosis mostly surrounding the DIP joint.  Skin is intact.  Patient is unable to actively extend at the DIP joint.  She can flex against resistance at the DIP joint.  Limited range of motion at the PIP joint in terms of flexion extension without erythema or ecchymosis.  No limited range of motion of any other finger of the left hand.  No loss of sensation, sensation is intact to light touch.    Image Results:  XR hand left 3+ views  Order: [id number]  Impression    1.  Severe first  CMC and triscaphe osteoarthrosis. Additional multifocal degenerative changes including chondrocalcinosis of the ulnar prestyloid recess/TFCC.  2.  Tiny foci of nonspecific calcification/mineralization along the expected location of the third finger extensor tendon, along the middle phalanx at the PIP joint level.  3.  Mild first MCP and radiocarpal osteoarthritis    Report Dictated on Workstation: [id number]   Electronically Signed By: Ramsey Sanderson MD   Electronically Signed Date/Time: 2025 12:43 PM EDT  Narrative    Patient Name: BETHEL MERCADO  : 1955  MRN: [id number]  Acct#: [id number]  Accession: [id number]  Exam Date/Time: 2025 12:33  Procedure: XR HAND 3+ VIEWS LEFT  Ordering Provider: LOTT ELENI  Reason For Exam: pain and swelling left middle finger      Left hand    CLINICAL INDICATION: Pain    TECHNIQUE: Three views of the left hand    COMPARISON: None    FINDINGS:  No cortical or trabecular irregularity to suggest a fracture. Bones are in normal anatomic alignment.    Severe first CMC and triscaphe osteoarthrosis. Additional multifocal degenerative changes including chondrocalcinosis of the ulnar prestyloid recess/TFCC. Tiny foci of nonspecific calcification/mineralization along the expected location of the third finger extensor tendon, along the middle phalanx at the PIP joint level. Mild first MCP and radiocarpal osteoarthritis.    No radiopaque foreign body is identified.    Normal bone mineral density.  Exam End: 25 12:37    Specimen Collected: 25 12:38 Last Resulted: 25 12:43   Received From: [facility]  Result Received: 25 10:59       Assessment/Plan   Encounter Diagnoses:  Injury of middle finger, left, initial encounter, left middle finger mallet finger versus extensor tendon rupture    Plan: Discussion with patient in regards to left middle finger injury with review of left hand x-rays which she brought in on a disk and report was  reviewed.  Explained to patient that differential diagnoses are mallet finger versus extensor tendon rupture.  Explained to patient that if this is a mallet finger injury, treatment would be 8 weeks of keeping her finger in posterior splint at all times, 24/7.  I did explain to patient that there is some concern for extensor tendon rupture as she does not have active ability to extend at the DIP joint as well as coupled with x-ray findings.  I have advised that patient follow-up with hand specialist Dr. Mriza Ji to discuss possible extensor tendon rupture.  In the meantime, she will keep finger extended and posterior splint.  She can take Tylenol or ibuprofen for pain.  She will follow-up with Dr. Mirza Ji.

## 2025-04-16 NOTE — PROGRESS NOTES
Subjective    Patient ID: Lexi Rivera is a 70 y.o. female.    Chief Complaint: Pain of the Left Middle Finger (OPNP L 3RD MALLET X 4/12/25)     Last Surgery: No surgery found  Last Surgery Date: No surgery found    HPI  The patient is a 70-year-old right-hand-dominant female who comes in after sustaining an injury to her left middle finger on April 12, 2025.  She was cleaning.  She then felt a pop and since then has had difficulty extending the middle finger at the DIP joint.  She was initially seen by the nurse practitioner.  She had x-rays obtained that showed a questionable mallet fracture.  She has had very little pain at the injury site.  Objective   Ortho Exam  The patient is in no acute distress.  Exam of her left middle finger reveals her skin of lip is intact.  She does have swelling and tenderness at the DIP joint.  She has appropriate function of her FDP.  However she is unable to actively maintain full extension at the DIP joint.    Image Results:  XR fingers right 2+ views  Narrative: Interpreted By:  Oscar Guadarrama,   STUDY:  XR FINGERS RIGHT 2+ VIEWS      INDICATION:  Signs/Symptoms:RIGHT THUMB PAIN.      ACCESSION NUMBER(S):  [id number]      ORDERING CLINICIAN:  SAMARIA DE LA CRUZ      FINDINGS:  Comminuted fracture of the right 1st with intra-articular extension  similar to the previous examination. Soft tissue swelling has  improved. No additional new findings.      Impression: Unchanged appearance comminuted right 1st distal phalanx fracture.      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  This will be treated as if it is a tendon injury.  She will wear a static splint full-time for the next 8 weeks.  She will follow-up in 8 weeks.

## 2025-04-23 ENCOUNTER — HOSPITAL ENCOUNTER (OUTPATIENT)
Dept: RADIOLOGY | Facility: EXTERNAL LOCATION | Age: 70
Discharge: HOME | End: 2025-04-23

## 2025-05-13 ENCOUNTER — OFFICE VISIT (OUTPATIENT)
Dept: ORTHOPEDIC SURGERY | Facility: CLINIC | Age: 70
End: 2025-05-13
Payer: MEDICARE

## 2025-05-13 VITALS — WEIGHT: 122 LBS | HEIGHT: 63 IN | BODY MASS INDEX: 21.62 KG/M2

## 2025-05-13 DIAGNOSIS — M20.012 ACQUIRED MALLET DEFORMITY OF LEFT MIDDLE FINGER: Primary | ICD-10-CM

## 2025-05-13 PROCEDURE — 3008F BODY MASS INDEX DOCD: CPT

## 2025-05-13 PROCEDURE — 1036F TOBACCO NON-USER: CPT

## 2025-05-13 PROCEDURE — 99213 OFFICE O/P EST LOW 20 MIN: CPT

## 2025-05-13 PROCEDURE — 1159F MED LIST DOCD IN RCRD: CPT

## 2025-05-13 PROCEDURE — 1160F RVW MEDS BY RX/DR IN RCRD: CPT

## 2025-05-13 NOTE — PROGRESS NOTES
Subjective    Patient ID: Lexi Rivera is a 70 y.o. female.    Chief Complaint: Follow-up of the Left Hand (Middle finger)     HPI  Lis 70-year-old right-hand-dominant female presents to the office for follow-up in regards to acquired left middle finger mallet injury, which was sustained on April 12, 2025.  She is now 4 weeks into the injury and has been extremely compliant in wearing her stack splint and keeping left middle finger in full extension.  Presents to the office today inquiring about the size of her back splint as well as the integrity of her skin.  States that she tries to keep finger as dry as possible with activities and showering as well as drying off afterwards.    Objective   Ortho Exam  Pleasant in no acute distress.  Stack splint was removed today in the office on a flat surface to keep finger in full extension.  There is notable maceration of the left middle finger skin where stack splint is applied.  No evidence of infection or skin breakdown.    Image Results:  XR fingers right 2+ views  Narrative: Interpreted By:  Oscar Guadarrama,   STUDY:  XR FINGERS RIGHT 2+ VIEWS      INDICATION:  Signs/Symptoms:RIGHT THUMB PAIN.      ACCESSION NUMBER(S):  [id number]      ORDERING CLINICIAN:  SAMARIA DE LA CRUZ      FINDINGS:  Comminuted fracture of the right 1st with intra-articular extension  similar to the previous examination. Soft tissue swelling has  improved. No additional new findings.      Impression: Unchanged appearance comminuted right 1st distal phalanx fracture.      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  She will continue to be treated for this tendon injury with continuous wear of a stack splint full-time for the remaining 4 weeks of her treatment.  I did downsize her  stack splint from a size 5 to a 4 for a better fit.  Discussion about keeping skin as dry as possible was done.  She can use CeraVe cream as needed to her skin.  She will follow-up in 4 weeks with Dr. Mirza Ji.

## 2025-06-11 ENCOUNTER — OFFICE VISIT (OUTPATIENT)
Dept: ORTHOPEDIC SURGERY | Facility: CLINIC | Age: 70
End: 2025-06-11
Payer: MEDICARE

## 2025-06-11 DIAGNOSIS — M20.012 ACQUIRED MALLET DEFORMITY OF LEFT MIDDLE FINGER: Primary | ICD-10-CM

## 2025-06-11 PROCEDURE — 1159F MED LIST DOCD IN RCRD: CPT | Performed by: ORTHOPAEDIC SURGERY

## 2025-06-11 PROCEDURE — 99213 OFFICE O/P EST LOW 20 MIN: CPT | Performed by: ORTHOPAEDIC SURGERY

## 2025-06-11 PROCEDURE — 99212 OFFICE O/P EST SF 10 MIN: CPT | Performed by: ORTHOPAEDIC SURGERY

## 2025-06-11 PROCEDURE — 1160F RVW MEDS BY RX/DR IN RCRD: CPT | Performed by: ORTHOPAEDIC SURGERY

## 2025-06-11 PROCEDURE — 1036F TOBACCO NON-USER: CPT | Performed by: ORTHOPAEDIC SURGERY

## 2025-06-11 NOTE — PROGRESS NOTES
Subjective    Patient ID: Lexi Rivera is a 70 y.o. female.    Chief Complaint: Follow-up of the Left Middle Finger     Last Surgery: No surgery found  Last Surgery Date: No surgery found    HPI  The patient returns today for follow-up of her left middle finger mallet injury.  This was a tendon mallet injury.  She was diligent about wearing her splint full-time for the past 2 months.    Objective   Ortho Exam  Exam of the patient's left middle finger reveals she lacks about 3 degrees of full extension at the DIP joint.  However she can maintain this amount of extension against resistance.  Flexion is limited as would be expected from wearing a splint.  She still has mild swelling near the DIP joint.    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  She may come out of her splint during the day.  She will wear the splint at night for the next 4 weeks.  She may begin using her left hand is much as she can tolerate.  She will follow-up as her symptoms dictate.

## 2025-07-16 ENCOUNTER — APPOINTMENT (OUTPATIENT)
Dept: ORTHOPEDIC SURGERY | Facility: CLINIC | Age: 70
End: 2025-07-16
Payer: MEDICARE

## 2025-07-28 ENCOUNTER — OFFICE VISIT (OUTPATIENT)
Dept: ORTHOPEDIC SURGERY | Facility: CLINIC | Age: 70
End: 2025-07-28
Payer: MEDICARE

## 2025-07-28 VITALS — HEIGHT: 63 IN | WEIGHT: 122 LBS | BODY MASS INDEX: 21.62 KG/M2

## 2025-07-28 DIAGNOSIS — M20.012 ACQUIRED MALLET DEFORMITY OF LEFT MIDDLE FINGER: Primary | ICD-10-CM

## 2025-07-28 PROCEDURE — 1159F MED LIST DOCD IN RCRD: CPT | Performed by: ORTHOPAEDIC SURGERY

## 2025-07-28 PROCEDURE — 99212 OFFICE O/P EST SF 10 MIN: CPT | Performed by: ORTHOPAEDIC SURGERY

## 2025-07-28 PROCEDURE — 3008F BODY MASS INDEX DOCD: CPT | Performed by: ORTHOPAEDIC SURGERY

## 2025-07-28 PROCEDURE — 1160F RVW MEDS BY RX/DR IN RCRD: CPT | Performed by: ORTHOPAEDIC SURGERY

## 2025-07-28 PROCEDURE — 1036F TOBACCO NON-USER: CPT | Performed by: ORTHOPAEDIC SURGERY

## 2025-07-28 PROCEDURE — 99213 OFFICE O/P EST LOW 20 MIN: CPT | Performed by: ORTHOPAEDIC SURGERY

## 2025-07-28 NOTE — PROGRESS NOTES
Subjective    Patient ID: Lexi Rivera is a 70 y.o. female.    Chief Complaint: Follow-up (F/U LT MIDDLE FINGER/)     Last Surgery: No surgery found  Last Surgery Date: No surgery found    HPI  The patient returns for follow-up of her left middle finger mallet digit.  She was diligent about wearing her splint full-time initially and then even more so after she noticed repeat flexion at the DIP joint.  She is concerned however because of continued swelling and pain.    Objective   Ortho Exam  The patient is in no acute distress.  Exam of her left hand and especially the middle finger reveals her skin flap is intact.  She does have moderate swelling dorsally at the DIP joint.  She has appropriate function of her FDS and FDP.  However she does not have active full extension at the DIP joint as it sits in about 15 degrees of flexion at rest.    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  I explained to the patient that in the end the final treatment would be a fusion as repair of the tendon is typically very difficult given the small size of it.  At this time the patient does not want to consider a fusion.  She however will stop wearing the splint in hopes of decreasing the irritation and swelling.  She will follow-up as her symptoms dictate.

## 2025-07-30 ENCOUNTER — APPOINTMENT (OUTPATIENT)
Dept: ORTHOPEDIC SURGERY | Facility: CLINIC | Age: 70
End: 2025-07-30
Payer: MEDICARE

## 2025-09-06 ENCOUNTER — HOSPITAL ENCOUNTER (EMERGENCY)
Facility: HOSPITAL | Age: 70
Discharge: HOME | End: 2025-09-06
Payer: MEDICARE

## 2025-09-06 ENCOUNTER — APPOINTMENT (OUTPATIENT)
Dept: RADIOLOGY | Facility: HOSPITAL | Age: 70
End: 2025-09-06
Payer: MEDICARE

## 2025-09-06 VITALS
WEIGHT: 117 LBS | OXYGEN SATURATION: 100 % | SYSTOLIC BLOOD PRESSURE: 171 MMHG | HEIGHT: 62 IN | TEMPERATURE: 97.9 F | DIASTOLIC BLOOD PRESSURE: 72 MMHG | BODY MASS INDEX: 21.53 KG/M2 | HEART RATE: 52 BPM | RESPIRATION RATE: 16 BRPM

## 2025-09-06 DIAGNOSIS — M79.605 PAIN OF LEFT LOWER EXTREMITY: Primary | ICD-10-CM

## 2025-09-06 PROCEDURE — 99284 EMERGENCY DEPT VISIT MOD MDM: CPT | Mod: 25

## 2025-09-06 PROCEDURE — 93971 EXTREMITY STUDY: CPT

## 2025-09-06 ASSESSMENT — PAIN - FUNCTIONAL ASSESSMENT: PAIN_FUNCTIONAL_ASSESSMENT: 0-10

## 2025-09-06 ASSESSMENT — PAIN SCALES - GENERAL: PAINLEVEL_OUTOF10: 0 - NO PAIN
